# Patient Record
Sex: MALE | Race: WHITE | NOT HISPANIC OR LATINO | URBAN - NONMETROPOLITAN AREA
[De-identification: names, ages, dates, MRNs, and addresses within clinical notes are randomized per-mention and may not be internally consistent; named-entity substitution may affect disease eponyms.]

---

## 2017-01-10 NOTE — PATIENT DISCUSSION
DISCUSSED OPION OF PPV AND PT GETTING BY OK AND TO RETURN IF HE CONTINUES TO HAVE DIF FROM HIS FLOATERS.

## 2017-12-05 NOTE — PATIENT DISCUSSION
HYDROXYCHLOROQUINE (PLAQUENIL) USE:  NO OCULAR TOXICITY OU. CONTINUE PLAQUENIL AS DIRECTED. SCHEDULE YEARLY VF 10-2 RED,OCT MACULA, AND ASSESMENT OF COLOR PLATES WITH DILATED FUNDUS EXAM.  KEEP FOLLOW UP AS SCHEDULED.

## 2017-12-05 NOTE — PATIENT DISCUSSION
Hydroxychloroquine (Plaquenil) Counseling:  I have discussed the ophthalmic risks of chronic hydroxychloroquine therapy, including but not limited to abnormal color vision, decreased central vision, and difficulty adjusting to darkness. The patient is encouraged to call back with any visual disturbance, and to keep follow-up appointments as scheduled.

## 2017-12-05 NOTE — PATIENT DISCUSSION
New Prescription: Lotemax (loteprednol etabonate): gel: 0.5% 1 drop four times a day into both eyes 12-

## 2017-12-05 NOTE — PATIENT DISCUSSION
MODERATE DRY EYES: PRESCRIBED DISAPPEARING OTC PRESERVATIVE OR OTC PRESERVATIVE FREE ARTIFICIAL TEARS BID &ndash; QID4-6X A DAY, OU AND THE DAILY INTAKE OF OMEGA-3 DHA/EPA FATTY ACIDS TO HELP RELIEVE SYMPTOMS. ADD NIGHTLY LUBRICATING OINTMENT OR GEL. RX LOTEMAX QID OU X 4 DAYS THEN TID UNTIL NEXT APPOINTMENT. RX REFRESH PM QHS. PUNCTAL PLUGS AND/OR LIPIFLOW TREATMENT NEXT VISIT IF NOT RESPONSIVE OR IF SYMPTOMS PERSIST.  RETURN FOR FOLLOW-UP AS SCHEDULED OR SOONER IF SYMPTOMS WORSEN

## 2017-12-21 NOTE — PATIENT DISCUSSION
DRY EYE WITH INFLAMMATION:  PRESERVATIVE FREE ARTIFICIAL TEARS Q2 HOURS, LID HYGIENE. PRESCRIBED LOTEMAX BID OU X 1 WEEK, QD OU X 1 WEEK THEN DISCONTINUE. CONSIDER RESTASIS  OR PUNCTAL PLUGS AND/OR LIPIFLOW AT FOLLOW. RETURN SOONER IF SYMPTOMS WORSEN.

## 2020-02-17 ENCOUNTER — IMPORTED ENCOUNTER (OUTPATIENT)
Dept: URBAN - NONMETROPOLITAN AREA CLINIC 1 | Facility: CLINIC | Age: 70
End: 2020-02-17

## 2020-02-17 PROBLEM — H35.141: Noted: 2020-02-17

## 2020-02-17 PROBLEM — E11.9: Noted: 2020-02-17

## 2020-02-17 PROBLEM — H18.422: Noted: 2020-02-17

## 2020-02-17 PROBLEM — H40.1133: Noted: 2020-02-17

## 2020-02-17 PROBLEM — H52.4: Noted: 2020-02-17

## 2020-02-17 PROCEDURE — V2520 CONTACT LENS HYDROPHILIC: HCPCS

## 2020-02-17 PROCEDURE — 92310 CONTACT LENS FITTING OU: CPT

## 2020-02-17 PROCEDURE — 92014 COMPRE OPH EXAM EST PT 1/>: CPT

## 2020-02-17 PROCEDURE — 92015 DETERMINE REFRACTIVE STATE: CPT

## 2020-02-17 NOTE — PATIENT DISCUSSION
"Hyperopia/Presbyopia-  Discussed refractive status in detail w/ pt last visit-  New GLRx and CLRx given today-  Monitor yearly or PRN POAG OU:-  Discussed diagnosis in detail w/ pt today. -  Signs/symptoms associated discussed-  VF OD done previously reliable with superior and inferior arcuate scotoma. No longer need to obtain almost complete scotoma. -  OCT done previously stable RNFL. -  PACHY done previously  and UTO OS. -  Continue Cosopt BID OU-  Continue Timolol QAM OU-  Continue Pred Forte OS PRN patient has not been using -  Continue Alphagan P PRN OS for dilating  -  IOPs stable 16 OD and UTO OS-  Disc asymetry noted OD 0.5 and OS 0.2. -  Continue to monitor  Band Keratopathy OS:-  Educated patient on condition-  Appears stable and doing well at this time  -  Continue FML prn OS-  Continue to monitorType II DM dx 2009-  Educated patient on condition-  Controlled with oral medication at this time. -  No daibetic retinopathy seen on exam today. -  Continue good blood sugar control/diet-  Continue to monitor every 6 months. Ocular Alleriges OUmild-  Discussed findings w/ pt today-  Sample of Pazeo given to use OU QD previously can send Rx if needed. 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 -  Appears stable today continue to monitor. PVD OU: -  Discussed findings of exam in detail with the patient. -  The risk of retinal detachment in patients with PVDs was discussed with the patient and the warning signs of retinal detachment were carefully reviewed with the patient. -  The patient was warned to return to the office or contact the ophthalmologist on call immediately if they experience signs of retinal detachment or changes in vision noted from today. -  Continue to monitor.  NOTE:  RX for Alphagan P given for patient to use PRN to help ""dilate"" and see better OS.; 's Notes: RM  2/17/20DFE  2/17/20OCT disc 4/12/18Optos  3/31/16VF  4/12/18 - no longer need to doGonio  UTO PACHY 10/1/15 - [place]en"

## 2020-08-21 ENCOUNTER — IMPORTED ENCOUNTER (OUTPATIENT)
Dept: URBAN - NONMETROPOLITAN AREA CLINIC 1 | Facility: CLINIC | Age: 70
End: 2020-08-21

## 2020-08-21 PROBLEM — H35.141: Noted: 2020-02-17

## 2020-08-21 PROBLEM — H40.1133: Noted: 2020-08-21

## 2020-08-21 PROBLEM — H18.422: Noted: 2020-02-17

## 2020-08-21 PROBLEM — H52.4: Noted: 2020-08-21

## 2020-08-21 PROBLEM — E11.9: Noted: 2020-02-17

## 2020-08-21 PROCEDURE — 99213 OFFICE O/P EST LOW 20 MIN: CPT

## 2020-08-21 NOTE — PATIENT DISCUSSION
"Resolving Ulcer OD- Discussed diagnosis in detail with patient - Patient was seen by Dr. Megha Alas 2 weeks ago for the ulcer and was given Tobradex - Continue Tobradex BID throug hthe weekend then taper to QD OD- Continue to monitor POAG OU:-  Discussed diagnosis in detail w/ pt today. -  Signs/symptoms associated discussed-  VF OD done previously reliable with superior and inferior arcuate scotoma. No longer need to obtain almost complete scotoma. -  OCT done previously stable RNFL. -  PACHY done previously  and UTO OS. -  Continue Cosopt BID OU-  Continue Timolol QAM OU-  Continue Pred Forte OS PRN patient has not been using -  Continue Alphagan P PRN OS for dilating  -  IOPs stable 16 OD and UTO OS-  Disc asymetry noted OD 0.5 and OS 0.2. -  Continue to monitor  Band Keratopathy OS:-  Educated patient on condition-  Appears stable and doing well at this time  -  Continue FML prn OS-  Continue to monitorType II DM dx 2009-  Educated patient on condition-  Controlled with oral medication at this time. -  No daibetic retinopathy seen on exam today. -  Continue good blood sugar control/diet-  Continue to monitor every 6 months. Ocular Alleriges OUmild-  Discussed findings w/ pt today-  Sample of Pazeo given to use OU QD previously can send Rx if needed. 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 -  Appears stable today continue to monitor. PVD OU: -  Discussed findings of exam in detail with the patient. -  The risk of retinal detachment in patients with PVDs was discussed with the patient and the warning signs of retinal detachment were carefully reviewed with the patient. -  The patient was warned to return to the office or contact the ophthalmologist on call immediately if they experience signs of retinal detachment or changes in vision noted from today. -  Continue to monitor.  NOTE:  RX for Alphagan P given for patient to use PRN to help ""dilate"" and see better OS.; 's Notes: RM  2/17/20DFE  2/17/20OCT disc 4/12/18Optos  3/31/16VF  4/12/18 - no longer need to doGonio  UTO PACHY 10/1/15 - [place]en"

## 2021-02-22 ENCOUNTER — IMPORTED ENCOUNTER (OUTPATIENT)
Dept: URBAN - NONMETROPOLITAN AREA CLINIC 1 | Facility: CLINIC | Age: 71
End: 2021-02-22

## 2021-02-22 PROBLEM — E11.9: Noted: 2020-02-17

## 2021-02-22 PROBLEM — H18.422: Noted: 2020-02-17

## 2021-02-22 PROBLEM — H35.141: Noted: 2020-02-17

## 2021-02-22 PROBLEM — H52.4: Noted: 2021-02-22

## 2021-02-22 PROBLEM — H40.1133: Noted: 2021-02-22

## 2021-02-22 PROCEDURE — 92015 DETERMINE REFRACTIVE STATE: CPT

## 2021-02-22 PROCEDURE — 92014 COMPRE OPH EXAM EST PT 1/>: CPT

## 2021-02-22 PROCEDURE — 92310 CONTACT LENS FITTING OU: CPT

## 2021-02-22 PROCEDURE — V2520 CONTACT LENS HYDROPHILIC: HCPCS

## 2021-02-22 NOTE — PATIENT DISCUSSION
"Presbyopia OU - Discussed diagnosis in detail with patient- [place] and CL RX given today- Continue to monitor- RTC 6 months POAG with VF and OCT  POAG OU:-  Discussed diagnosis in detail w/ pt today. -  Signs/symptoms associated discussed-  VF OD done previously reliable with superior and inferior arcuate scotoma. No longer need to obtain almost complete scotoma. -  OCT done previously stable RNFL. -  PACHY done previously  and UTO OS. -  Continue Cosopt BID OU-  Continue Timolol QAM OU-  Continue Pred Forte OS PRN patient has not been using -  Continue Alphagan P PRN OS for dilating  -  IOPs stable 16 OD and UTO OS-  Disc asymetry noted OD 0.5 and OS 0.2. -  Continue to monitor  Band Keratopathy OS:-  Educated patient on condition-  Appears stable and doing well at this time  -  Continue FML prn OS-  Continue to monitorType II DM dx 2009-  Educated patient on condition-  Controlled with oral medication at this time. -  No daibetic retinopathy seen on exam today. -  Continue good blood sugar control/diet-  Continue to monitor every 6 months. Ocular Alleriges OUmild-  Discussed findings w/ pt today-  Sample of Pazeo given to use OU QD previously can send Rx if needed. 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 -  Appears stable today continue to monitor. PVD OU: -  Discussed findings of exam in detail with the patient. -  The risk of retinal detachment in patients with PVDs was discussed with the patient and the warning signs of retinal detachment were carefully reviewed with the patient. -  The patient was warned to return to the office or contact the ophthalmologist on call immediately if they experience signs of retinal detachment or changes in vision noted from today. -  Continue to monitor.  NOTE:  RX for Alphagan P given for patient to use PRN to help ""dilate"" and see better OS.; 's Notes: RM  2/22/21DFE  2/17/20OCT disc 4/12/18Optos  3/31/16VF  4/12/18 - no longer need to doGonio  UTO PACHY 10/1/15 - [place]"

## 2021-08-26 ENCOUNTER — IMPORTED ENCOUNTER (OUTPATIENT)
Dept: URBAN - NONMETROPOLITAN AREA CLINIC 1 | Facility: CLINIC | Age: 71
End: 2021-08-26

## 2021-08-26 PROCEDURE — 99213 OFFICE O/P EST LOW 20 MIN: CPT

## 2021-08-26 PROCEDURE — 92133 CPTRZD OPH DX IMG PST SGM ON: CPT

## 2021-08-26 PROCEDURE — 92083 EXTENDED VISUAL FIELD XM: CPT

## 2021-08-26 NOTE — PATIENT DISCUSSION
"Presbyopia OU - Discussed diagnosis in detail with patient- Continue to monitorPOAG OU:-  Discussed diagnosis in detail w/ pt today. -  Signs/symptoms associated discussed-  VF OD done today OD shows Poor field with Moderate depression -  OCT done today very poor scan -  PACHY done previously  and UTO OS. -  D/C Cosopt -  Continue Timolol BID OU-  Start Latanoprost QHS only in OD -  Continue Pred Forte OS PRN patient has not been using -  Continue Alphagan P PRN OS for dilating  -  IOPs stable 22 OD and UTO OS-  Disc asymetry noted OD 0.5 and OS 0.2. -  Continue to monitor  -  RTC 3 weeks pressure check Band Keratopathy OS:-  Educated patient on condition-  Appears stable and doing well at this time  -  Continue FML prn OS-  Continue to monitorType II DM dx 2009-  Educated patient on condition-  Controlled with oral medication at this time. -  No daibetic retinopathy seen on exam today. -  Continue good blood sugar control/diet-  Continue to monitor every 6 months. Ocular Alleriges OUmild-  Discussed findings w/ pt today-  Sample of Pazeo given to use OU QD previously can send Rx if needed. 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 -  Appears stable today continue to monitor. PVD OU: -  Discussed findings of exam in detail with the patient. -  The risk of retinal detachment in patients with PVDs was discussed with the patient and the warning signs of retinal detachment were carefully reviewed with the patient. -  The patient was warned to return to the office or contact the ophthalmologist on call immediately if they experience signs of retinal detachment or changes in vision noted from today. -  Continue to monitor.  NOTE:  RX for Alphagan P given for patient to use PRN to help ""dilate"" and see better OS.; 's Notes: RM  2/22/21DFE  2/17/20OCT disc 4/12/18Optos  3/31/16VF  8/26/21- no longer need to doGonio  UTO PACHY 10/1/15 - [place]"

## 2021-09-16 ENCOUNTER — IMPORTED ENCOUNTER (OUTPATIENT)
Dept: URBAN - NONMETROPOLITAN AREA CLINIC 1 | Facility: CLINIC | Age: 71
End: 2021-09-16

## 2021-09-16 PROCEDURE — 99213 OFFICE O/P EST LOW 20 MIN: CPT

## 2021-09-16 NOTE — PATIENT DISCUSSION
"Presbyopia OU - Discussed diagnosis in detail with patient- Continue to monitorPOAG OU:-  Discussed diagnosis in detail w/ pt today. -  Signs/symptoms associated discussed-  VF OD done previouly OD shows Poor field with Moderate depression -  OCT done revoiusly very poor scan -  PACHY done previously  and UTO OS. -  D/C Cosopt -  D/C  Latanoprost QHS due to no change in pressure -  Continue Timolol BID OU-  Start Rocklatan QHS OD only sample given today - Continue Pred Forte OS PRN patient has not been using -  Continue Alphagan P PRN OS for dilating  -  IOPs stable 22 OD and UTO OS no change in decrease in pressure -  Disc asymetry noted OD 0.5 and OS 0.2. -  Continue to monitor  -  RTC 3 weeks pressure check Band Keratopathy OS:-  Educated patient on condition-  Appears stable and doing well at this time  -  Continue FML prn OS-  Continue to monitorType II DM dx 2009-  Educated patient on condition-  Controlled with oral medication at this time. -  No daibetic retinopathy seen on exam today. -  Continue good blood sugar control/diet-  Continue to monitor every 6 months. Ocular Alleriges OUmild-  Discussed findings w/ pt today-  Sample of Pazeo given to use OU QD previously can send Rx if needed. 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 -  Appears stable today continue to monitor. PVD OU: -  Discussed findings of exam in detail with the patient. -  The risk of retinal detachment in patients with PVDs was discussed with the patient and the warning signs of retinal detachment were carefully reviewed with the patient. -  The patient was warned to return to the office or contact the ophthalmologist on call immediately if they experience signs of retinal detachment or changes in vision noted from today. -  Continue to monitor.  NOTE:  RX for Alphagan P given for patient to use PRN to help ""dilate"" and see better OS.; 's Notes: RM  2/22/21DFE  2/17/20OCT disc 4/12/18Optos  3/31/16VF  8/26/21- no longer need to doGonio  UTO PACHY 10/1/15 - [place]"

## 2021-10-07 ENCOUNTER — IMPORTED ENCOUNTER (OUTPATIENT)
Dept: URBAN - NONMETROPOLITAN AREA CLINIC 1 | Facility: CLINIC | Age: 71
End: 2021-10-07

## 2021-10-07 PROBLEM — H40.1133: Noted: 2021-10-07

## 2021-10-07 PROBLEM — E11.9: Noted: 2021-10-07

## 2021-10-07 PROBLEM — H35.141: Noted: 2021-10-07

## 2021-10-07 PROBLEM — H18.422: Noted: 2021-10-07

## 2021-10-07 PROBLEM — H52.4: Noted: 2021-10-07

## 2021-10-07 PROCEDURE — 99213 OFFICE O/P EST LOW 20 MIN: CPT

## 2021-10-07 NOTE — PATIENT DISCUSSION
"POAG OU:- Discussed diagnosis in detail w/ pt today. - Signs/symptoms associated discussed- VF OD done previouly OD shows Poor field with Moderate depression - OCT done revoiusly very poor scan - PACHY done previously  and UTO OS. - D/C Cosopt - D/C  Latanoprost QHS due to no change in pressure - Continue Timolol BID OU- Continue Rocklatan QHS OD only sample given today  Continue Tobradex (not Pred) OS PRN recommend using for a few days and D/C CL for a few days - Samples of Lotemax SM given today to use PRN for flare ups - Continue Alphagan P PRN OS for dilating  - IOPs stable 15 OD and UTO OS- Disc asymetry noted OD 0.5 and OS 0.2. - Continue to monitor  Band Keratopathy OS:- Educated patient on condition- Appears stable and doing well at this time  - Continue FML prn OS- Continue to monitorType II DM dx 2009- Educated patient on condition- Controlled with oral medication at this time. - No daibetic retinopathy seen on exam today. - Continue good blood sugar control/diet- Continue to monitor every 6 months. Ocular Alleriges OUmild- Discussed findings w/ pt today- Sample of Pazeo given to use OU QD previously can send Rx if needed. 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 - Appears stable today continue to monitor. PVD OU: - Discussed findings of exam in detail with the patient. - The risk of retinal detachment in patients with PVDs was discussed with the patient and the warning signs of retinal detachment were carefully reviewed with the patient. - The patient was warned to return to the office or contact the ophthalmologist on call immediately if they experience signs of retinal detachment or changes in vision noted from today. - Continue to monitor.  Presbyopia OU - Discussed diagnosis in detail with patient- Continue to monitorNOTE:  RX for Alphagan P given for patient to use PRN to help ""dilate"" and see better OS.; 's Notes: RM  2/22/21DFE  2/17/20OCT disc 4/12/18Optos  3/31/16VF  8/26/21- no longer need to doGonio  UTO PACHY 10/1/15 - Adina"

## 2022-04-08 ENCOUNTER — FOLLOW UP (OUTPATIENT)
Dept: URBAN - NONMETROPOLITAN AREA CLINIC 1 | Facility: CLINIC | Age: 72
End: 2022-04-08

## 2022-04-08 DIAGNOSIS — H40.1133: ICD-10-CM

## 2022-04-08 PROCEDURE — 99213 OFFICE O/P EST LOW 20 MIN: CPT

## 2022-04-08 ASSESSMENT — TONOMETRY: OD_IOP_MMHG: 13

## 2022-04-15 ASSESSMENT — TONOMETRY
OD_IOP_MMHG: 22
OD_IOP_MMHG: 16
OD_IOP_MMHG: 15
OD_IOP_MMHG: 16
OD_IOP_MMHG: 16
OD_IOP_MMHG: 22

## 2022-04-15 ASSESSMENT — PACHYMETRY
OD_CT_UM: 472; ADJ: VTHIN

## 2022-04-15 ASSESSMENT — VISUAL ACUITY
OD_SC: 20/400
OD_SC: 20/300
OD_SC: 20/200
OD_SC: 20/400
OS_SC: 20/200
OD_SC: 20/200
OD_SC: 20/400
OD_SC: 20/400

## 2022-05-27 ENCOUNTER — FOLLOW UP (OUTPATIENT)
Dept: URBAN - NONMETROPOLITAN AREA CLINIC 1 | Facility: CLINIC | Age: 72
End: 2022-05-27

## 2022-05-27 DIAGNOSIS — H52.4: ICD-10-CM

## 2022-05-27 PROCEDURE — 92015 DETERMINE REFRACTIVE STATE: CPT

## 2022-05-27 PROCEDURE — 92310 CONTACT LENS FITTING OU: CPT

## 2022-05-27 ASSESSMENT — VISUAL ACUITY: OD_CC: 20/400

## 2022-11-29 ENCOUNTER — FOLLOW UP (OUTPATIENT)
Dept: URBAN - NONMETROPOLITAN AREA CLINIC 1 | Facility: CLINIC | Age: 72
End: 2022-11-29

## 2022-11-29 DIAGNOSIS — E11.9: ICD-10-CM

## 2022-11-29 DIAGNOSIS — H40.1133: ICD-10-CM

## 2022-11-29 DIAGNOSIS — Z96.1: ICD-10-CM

## 2022-11-29 PROCEDURE — 99213 OFFICE O/P EST LOW 20 MIN: CPT

## 2022-11-29 ASSESSMENT — TONOMETRY: OD_IOP_MMHG: 12

## 2022-11-29 ASSESSMENT — VISUAL ACUITY: OD_SC: CF 2FT

## 2022-11-29 NOTE — PATIENT DISCUSSION
[facility] OU sent to [facility] with refills today.

## 2023-05-30 ENCOUNTER — COMPREHENSIVE EXAM (OUTPATIENT)
Dept: URBAN - NONMETROPOLITAN AREA CLINIC 1 | Facility: CLINIC | Age: 73
End: 2023-05-30

## 2023-05-30 DIAGNOSIS — Z79.4: ICD-10-CM

## 2023-05-30 DIAGNOSIS — H52.4: ICD-10-CM

## 2023-05-30 DIAGNOSIS — E11.9: ICD-10-CM

## 2023-05-30 DIAGNOSIS — H40.1133: ICD-10-CM

## 2023-05-30 PROCEDURE — 92015 DETERMINE REFRACTIVE STATE: CPT

## 2023-05-30 PROCEDURE — 92014 COMPRE OPH EXAM EST PT 1/>: CPT

## 2023-05-30 RX ORDER — KETOROLAC TROMETHAMINE 5 MG/ML: 1 SOLUTION OPHTHALMIC TWICE A DAY

## 2023-05-30 ASSESSMENT — TONOMETRY: OD_IOP_MMHG: 13

## 2023-05-30 ASSESSMENT — VISUAL ACUITY: OD_SC: CF 2FT

## 2023-12-01 ENCOUNTER — FOLLOW UP (OUTPATIENT)
Dept: URBAN - NONMETROPOLITAN AREA CLINIC 1 | Facility: CLINIC | Age: 73
End: 2023-12-01

## 2023-12-01 DIAGNOSIS — E11.9: ICD-10-CM

## 2023-12-01 DIAGNOSIS — H18.422: ICD-10-CM

## 2023-12-01 DIAGNOSIS — H40.1133: ICD-10-CM

## 2023-12-01 DIAGNOSIS — H16.223: ICD-10-CM

## 2023-12-01 DIAGNOSIS — Z79.4: ICD-10-CM

## 2023-12-01 PROCEDURE — 92250 FUNDUS PHOTOGRAPHY W/I&R: CPT

## 2023-12-01 PROCEDURE — 99214 OFFICE O/P EST MOD 30 MIN: CPT

## 2023-12-01 ASSESSMENT — TONOMETRY: OD_IOP_MMHG: 12

## 2023-12-01 ASSESSMENT — VISUAL ACUITY: OD_SC: CF 4FT

## 2024-06-06 ENCOUNTER — FOLLOW UP (OUTPATIENT)
Dept: URBAN - NONMETROPOLITAN AREA CLINIC 1 | Facility: CLINIC | Age: 74
End: 2024-06-06

## 2024-06-06 DIAGNOSIS — E11.9: ICD-10-CM

## 2024-06-06 DIAGNOSIS — Z79.4: ICD-10-CM

## 2024-06-06 DIAGNOSIS — H40.1133: ICD-10-CM

## 2024-06-06 PROCEDURE — 99213 OFFICE O/P EST LOW 20 MIN: CPT

## 2024-06-06 ASSESSMENT — TONOMETRY: OD_IOP_MMHG: 16

## 2024-12-09 ENCOUNTER — FOLLOW UP (OUTPATIENT)
Age: 74
End: 2024-12-09

## 2024-12-09 DIAGNOSIS — H40.1133: ICD-10-CM

## 2024-12-09 DIAGNOSIS — Z79.4: ICD-10-CM

## 2024-12-09 DIAGNOSIS — E11.9: ICD-10-CM

## 2024-12-09 DIAGNOSIS — H10.11: ICD-10-CM

## 2024-12-09 PROCEDURE — 99213 OFFICE O/P EST LOW 20 MIN: CPT

## 2025-06-16 ENCOUNTER — FOLLOW UP (OUTPATIENT)
Age: 75
End: 2025-06-16

## 2025-06-16 DIAGNOSIS — H40.1133: ICD-10-CM

## 2025-06-16 PROCEDURE — 99213 OFFICE O/P EST LOW 20 MIN: CPT
